# Patient Record
(demographics unavailable — no encounter records)

---

## 2025-02-14 NOTE — PHYSICAL EXAM
[Healthy Appearing] : healthy appearing [Well Nourished] : well nourished [Interactive] : interactive [Normal Appearance] : normal appearance [Well formed] : well formed [Normally Set] : normally set [Normal S1 and S2] : normal S1 and S2 [Clear to Ausculation Bilaterally] : clear to auscultation bilaterally [Abdomen Soft] : soft [Abdomen Tenderness] : non-tender [] : no hepatosplenomegaly [Normal] : normal  [Acanthosis Nigricans___] : no acanthosis nigricans [Goiter] : no goiter [Enlarged Diffusely] : was not enlarged [Murmur] : no murmurs [FreeTextEntry1] : absent [de-identified] :  deferred- Matt checked his testicles- showed 6 cc bead of the orchidometer.

## 2025-02-14 NOTE — CONSULT LETTER
[Dear  ___] : Dear  [unfilled], [Courtesy Letter:] : I had the pleasure of seeing your patient, [unfilled], in my office today. [Please see my note below.] : Please see my note below. [Sincerely,] : Sincerely, [FreeTextEntry2] : Gabe Branham MD [FreeTextEntry3] : Felisha Malik MD

## 2025-02-14 NOTE — PHYSICAL EXAM
[Healthy Appearing] : healthy appearing [Well Nourished] : well nourished [Interactive] : interactive [Normal Appearance] : normal appearance [Well formed] : well formed [Normally Set] : normally set [Normal S1 and S2] : normal S1 and S2 [Clear to Ausculation Bilaterally] : clear to auscultation bilaterally [Abdomen Soft] : soft [Abdomen Tenderness] : non-tender [] : no hepatosplenomegaly [Normal] : normal  [Acanthosis Nigricans___] : no acanthosis nigricans [Goiter] : no goiter [Enlarged Diffusely] : was not enlarged [Murmur] : no murmurs [FreeTextEntry1] : absent [de-identified] :  deferred- Matt checked his testicles- showed 6 cc bead of the orchidometer.

## 2025-02-14 NOTE — HISTORY OF PRESENT ILLNESS
[Headaches] : headaches [Polyuria] : no polyuria [Polydipsia] : no polydipsia [Constipation] : no constipation [Cold Intolerance] : no cold intolerance [Heat Intolerance] : no heat intolerance [Abdominal Pain] : no abdominal pain [Weight Loss] : no weight loss [FreeTextEntry2] : Matt is a 13 year 4 month old boy, here with his mother, with concerns for growth by the PCP. He was previously seen by an endocrinologist a few months ago (unsure exactly when) and reportedly had workup done.    His growth records are not available for my review.  Review of growth data shows linear growth around the 25th percentile and weight gain around the 25th percenitle until 11 years and then slow down to 10th percentile by 13 eyars.   Matt had a bone age in 11/2024 and this was reported as 11.5 years.   He is a healthy boy, in the 7th grade. He has occasional headaches usually on Fridays usually from dehydration. He drinks 8 bottles of water. He tore his left meniscus 2 years ago and was cleared by sports doc. He fractured his ankle about 3 years ago. A diet recall was obtained and as follows: Diet hx: For breakfast: He gets Hallie, hash browns, eats Cream Cheese bagels, 2 eggs, milk, cheese Lunch: He doesn't eat school lunch. He eats at 6 pm. During the day he eats a bag of chips.  Dinner: He eats chicken wings, bagel, egg, hamburger.  He does have occasional midnight snacks.  He has seen a nutritionist in the past.

## 2025-02-14 NOTE — END OF VISIT
[] : Fellow [FreeTextEntry3] : I saw and examined patient with Dr. Chung.   We briefly discussed FDA approved indications for GH.  Further evaluation/intervention to be determined pending labs.

## 2025-02-14 NOTE — PHYSICAL EXAM
[Healthy Appearing] : healthy appearing [Well Nourished] : well nourished [Interactive] : interactive [Normal Appearance] : normal appearance [Well formed] : well formed [Normally Set] : normally set [Normal S1 and S2] : normal S1 and S2 [Clear to Ausculation Bilaterally] : clear to auscultation bilaterally [Abdomen Soft] : soft [Abdomen Tenderness] : non-tender [] : no hepatosplenomegaly [Normal] : normal  [Acanthosis Nigricans___] : no acanthosis nigricans [Goiter] : no goiter [Enlarged Diffusely] : was not enlarged [Murmur] : no murmurs [FreeTextEntry1] : absent [de-identified] :  deferred- Matt checked his testicles- showed 6 cc bead of the orchidometer.

## 2025-02-14 NOTE — PAST MEDICAL HISTORY

## 2025-02-14 NOTE — PHYSICAL EXAM
[Healthy Appearing] : healthy appearing [Well Nourished] : well nourished [Interactive] : interactive [Normal Appearance] : normal appearance [Well formed] : well formed [Normally Set] : normally set [Normal S1 and S2] : normal S1 and S2 [Clear to Ausculation Bilaterally] : clear to auscultation bilaterally [Abdomen Soft] : soft [Abdomen Tenderness] : non-tender [] : no hepatosplenomegaly [Normal] : normal  [Acanthosis Nigricans___] : no acanthosis nigricans [Goiter] : no goiter [Enlarged Diffusely] : was not enlarged [Murmur] : no murmurs [FreeTextEntry1] : absent [de-identified] :  deferred- Matt checked his testicles- showed 6 cc bead of the orchidometer.

## 2025-02-14 NOTE — ASSESSMENT
[FreeTextEntry1] : Matt is a 13 year 4 month old young man with growth concerns.  His height today is 19%, weight is 9% and BMI is 10%. Physical exam is overall unremarkable but limited due to puberty status not assessed by a physician. We accommodated their preference to have only a male doctor assess for puberty. On review of the growth data from the PCP, it shows she has been growing steadily and tracking along his genetic potential, thought height today is below genetic potential.   We reassured mother that bone age tracks him to reach genetic potential.  Based on his clinical picture, it appears he likely has constitutional delay as he has delayed bone age. There are factors that can affect growth such as nutrition, thyroid disease, or malabsorption. His weight is on the lower side and we recommended nutrition to optimize his diet for growth. He does not eat school lunch which leads to about 8-10 hours before he is eating a nutritious meal. We recommended bringing lunch from home if that would help him to eat. He has been evaluated by a previous endocrinologist in the past and mother reported there was some workup done in the past. We did ask for the bloodwork and/or all other workup to be sent to us but mother wanted the levels repeated. At this time, we will order screening labs without a bone age, as it has been done within the past 6 months. Overall, his growth rate is reassuring and will follow up with labs to make sure there is no etiology that could affect growth. Will call mother with results.   PLAN: - Labs: Screening labs for short stature. - Nutrition recommended - Will call with results - Follow up in 4 months  James Chung | PGY4 Pediatric Endocrinology Fellow

## 2025-02-14 NOTE — CONSULT LETTER
[Dear  ___] : Dear  [unfilled], [Courtesy Letter:] : I had the pleasure of seeing your patient, [unfilled], in my office today. [Please see my note below.] : Please see my note below. [Sincerely,] : Sincerely, [FreeTextEntry2] : Gabe Branahm MD [FreeTextEntry3] : Felisha Malik MD

## 2025-02-14 NOTE — PHYSICAL EXAM
[Healthy Appearing] : healthy appearing [Well Nourished] : well nourished [Interactive] : interactive [Normal Appearance] : normal appearance [Well formed] : well formed [Normally Set] : normally set [Normal S1 and S2] : normal S1 and S2 [Clear to Ausculation Bilaterally] : clear to auscultation bilaterally [Abdomen Soft] : soft [Abdomen Tenderness] : non-tender [] : no hepatosplenomegaly [Normal] : normal  [Acanthosis Nigricans___] : no acanthosis nigricans [Goiter] : no goiter [Enlarged Diffusely] : was not enlarged [Murmur] : no murmurs [FreeTextEntry1] : absent [de-identified] :  deferred- Matt checked his testicles- showed 6 cc bead of the orchidometer.

## 2025-02-14 NOTE — FAMILY HISTORY
[FreeTextEntry5] : 12 years [FreeTextEntry2] : 5 siblings: Brother: 22 years old 71 inches, sister 20 years old 65 inches, Brother: 18 years 74 inches, sister 17 years old, 71 inches, Brother 10 years old 56 inches (all the siblings started growing around the age of 10)

## 2025-02-21 NOTE — HISTORY OF PRESENT ILLNESS
[FreeTextEntry2] :  Dear Dr. CAMILLE PAUL I saw your patient in the Pediatric Endocrine clinic at the [facility] This 13 year-old boy was referred for evaluation for possible short stature A review of his current growth chart shows that he is growing at the 19th percentile for height, towards a projected height of 68 inches The rest of his history is remarkable for *** His past medical history is remarkable for a normal state of health. ***

## 2025-02-21 NOTE — DISCUSSION/SUMMARY
[FreeTextEntry1] : This 13 year-old boy presented with concerns regarding short stature His short stature screening lab tests were all normal Today, he is growing at the *** %ile for height, towards a projected height of *** inches  PLAN: 1.  Bone age x-ray for assessment of the degree of skeletal maturation 2.  Monitor auxology and recommend GHST if there's evidence for growth deceleration We ordered the labs shown in the section on Plan He is scheduled for a follow-up visit in 6 months His parent was satisfied with the explanation and the conduct of the visit.

## 2025-02-21 NOTE — CONSULT LETTER
[Dear  ___] : Dear  [unfilled], [Please see my note below.] : Please see my note below. [Consult Closing:] : Thank you very much for allowing me to participate in the care of this patient.  If you have any questions, please do not hesitate to contact me. [Sincerely,] : Sincerely, [FreeTextEntry3] : Arnoldo Decker M.D., FAAP. Professor of Pediatrics, [employer] at [facility] Chief of Endocrinology, [facility] Director, [facility] Diabetes [place] [address] Tel: [phone]; Fax: [phone]; Email: [email or web address]